# Patient Record
Sex: MALE | Race: WHITE | NOT HISPANIC OR LATINO | Employment: FULL TIME | URBAN - METROPOLITAN AREA
[De-identification: names, ages, dates, MRNs, and addresses within clinical notes are randomized per-mention and may not be internally consistent; named-entity substitution may affect disease eponyms.]

---

## 2017-08-03 ENCOUNTER — PATIENT OUTREACH (OUTPATIENT)
Dept: ADMINISTRATIVE | Facility: HOSPITAL | Age: 52
End: 2017-08-03

## 2019-07-01 ENCOUNTER — OFFICE VISIT (OUTPATIENT)
Dept: INTERNAL MEDICINE | Facility: CLINIC | Age: 54
End: 2019-07-01
Payer: COMMERCIAL

## 2019-07-01 VITALS
HEIGHT: 69 IN | SYSTOLIC BLOOD PRESSURE: 118 MMHG | OXYGEN SATURATION: 98 % | BODY MASS INDEX: 29.12 KG/M2 | HEART RATE: 83 BPM | WEIGHT: 196.63 LBS | DIASTOLIC BLOOD PRESSURE: 82 MMHG | TEMPERATURE: 99 F

## 2019-07-01 DIAGNOSIS — F32.A DEPRESSION, UNSPECIFIED DEPRESSION TYPE: ICD-10-CM

## 2019-07-01 DIAGNOSIS — K21.9 GASTROESOPHAGEAL REFLUX DISEASE, ESOPHAGITIS PRESENCE NOT SPECIFIED: ICD-10-CM

## 2019-07-01 DIAGNOSIS — Z12.11 SCREENING FOR COLON CANCER: ICD-10-CM

## 2019-07-01 DIAGNOSIS — Z00.00 ROUTINE GENERAL MEDICAL EXAMINATION AT A HEALTH CARE FACILITY: Primary | ICD-10-CM

## 2019-07-01 PROCEDURE — 99999 PR PBB SHADOW E&M-EST. PATIENT-LVL III: ICD-10-PCS | Mod: PBBFAC,,, | Performed by: INTERNAL MEDICINE

## 2019-07-01 PROCEDURE — 99999 PR PBB SHADOW E&M-EST. PATIENT-LVL III: CPT | Mod: PBBFAC,,, | Performed by: INTERNAL MEDICINE

## 2019-07-01 PROCEDURE — 99386 PREV VISIT NEW AGE 40-64: CPT | Mod: S$GLB,,, | Performed by: INTERNAL MEDICINE

## 2019-07-01 PROCEDURE — 99386 PR PREVENTIVE VISIT,NEW,40-64: ICD-10-PCS | Mod: S$GLB,,, | Performed by: INTERNAL MEDICINE

## 2019-07-01 NOTE — PROGRESS NOTES
"HPI:  Patient is a 53-year-old man who comes in today for yearly physical.  I have not seen him in about 8 years.  Patient is complaining of his left ear being full and it popping all the time.  He has no other complaints.      Current MEDS: medcard review, verified and update  Allergies: Per the electronic medical record    Past Medical History:   Diagnosis Date    Depression     GERD (gastroesophageal reflux disease)     IBS (irritable bowel syndrome)        Past Surgical History:   Procedure Laterality Date    DENTAL SURGERY      EYE SURGERY         SHx: per the electronic medical record    FHx: recorded in the electronic medical record    ROS:    denies any chest pains or shortness of breath. Denies any nausea, vomiting or diarrhea. Denies any fever, chills or sweats. Denies any change in weight, voice, stool, skin or hair. Denies any dysuria, dyspepsia or dysphagia. Denies any change in vision, hearing or headaches. Denies any swollen lymph nodes or loss of memory.    PE:  /82 (BP Location: Left arm)   Pulse 83   Temp 98.7 °F (37.1 °C) (Tympanic)   Ht 5' 9" (1.753 m)   Wt 89.2 kg (196 lb 10.4 oz)   SpO2 98%   BMI 29.04 kg/m²   Gen: Well-developed, well-nourished, male, in no acute distress, oriented x3  HEENT: neck is supple, no adenopathy, carotids 2+ equal without bruits, thyroid exam normal size without nodules.  Left external auditory canal is impacted with cerumen  CHEST: clear to auscultation and percussion  CVS: regular rate and rhythm without significant murmur, gallop, or rubs  ABD: soft, benign, no rebound no guarding, no distention.  Bowel sounds are normal.     nontender.  No palpable masses.  No organomegaly and no audible bruits.  RECTAL: no masses.  Prostate  20 Grams without nodules.  EXT: no clubbing, cyanosis, or edema  LYMPH: no cervical, inguinal, or axillary adenopathy  FEET: no loss of sensation.  No ulcers or pressure sores.  NEURO: gait normal.  Cranial nerves II- XII " intact. No nystagmus.  Speech normal.   Gross motor and sensory unremarkable.    Lab Results   Component Value Date    WBC 8.94 04/06/2009    HGB 16.4 04/06/2009    HCT 49.4 04/06/2009     04/06/2009    CHOL 183 09/08/2004    TRIG 127 09/08/2004    HDL 40.0 09/08/2004    ALT 36 04/06/2009    AST 30 04/06/2009     04/06/2009    K 4.4 04/06/2009     04/06/2009    CREATININE 1.2 04/06/2009    BUN 15 04/06/2009    CO2 26 04/06/2009    TSH 0.86 05/31/2007       Impression:  Cerumen impaction  Other medical problems below, stable  Patient Active Problem List   Diagnosis    Depression    GERD (gastroesophageal reflux disease)       Plan:   Orders Placed This Encounter    CBC auto differential    Comprehensive metabolic panel    Lipid panel    TSH    PSA, Screening    Case request GI: COLONOSCOPY    He was instructed how to clean out the ear using over-the-counter ear drops.  If he is not successful in 2 days, he should call me.  He is due to have the annual blood work above.  He is also due for his 1st colonoscopy.  He should be seen on a yearly basis or otherwise as needed    This note is generated with speech recognition software and is subject to transcription error and sound alike phrases that may be missed by proofreading.

## 2019-07-03 ENCOUNTER — OFFICE VISIT (OUTPATIENT)
Dept: OTOLARYNGOLOGY | Facility: CLINIC | Age: 54
End: 2019-07-03
Payer: COMMERCIAL

## 2019-07-03 VITALS
TEMPERATURE: 98 F | BODY MASS INDEX: 28.96 KG/M2 | HEART RATE: 84 BPM | SYSTOLIC BLOOD PRESSURE: 118 MMHG | HEIGHT: 69 IN | DIASTOLIC BLOOD PRESSURE: 84 MMHG | WEIGHT: 195.56 LBS

## 2019-07-03 DIAGNOSIS — H61.22 IMPACTED CERUMEN OF LEFT EAR: Primary | ICD-10-CM

## 2019-07-03 PROCEDURE — 99203 OFFICE O/P NEW LOW 30 MIN: CPT | Mod: 25,S$GLB,, | Performed by: PHYSICIAN ASSISTANT

## 2019-07-03 PROCEDURE — 92504 EAR MICROSCOPY EXAMINATION: CPT | Mod: S$GLB,,, | Performed by: PHYSICIAN ASSISTANT

## 2019-07-03 PROCEDURE — 99999 PR PBB SHADOW E&M-EST. PATIENT-LVL III: ICD-10-PCS | Mod: PBBFAC,,, | Performed by: PHYSICIAN ASSISTANT

## 2019-07-03 PROCEDURE — 92504 PR EAR MICROSCOPY EXAMINATION: ICD-10-PCS | Mod: S$GLB,,, | Performed by: PHYSICIAN ASSISTANT

## 2019-07-03 PROCEDURE — 3008F PR BODY MASS INDEX (BMI) DOCUMENTED: ICD-10-PCS | Mod: CPTII,S$GLB,, | Performed by: PHYSICIAN ASSISTANT

## 2019-07-03 PROCEDURE — 99999 PR PBB SHADOW E&M-EST. PATIENT-LVL III: CPT | Mod: PBBFAC,,, | Performed by: PHYSICIAN ASSISTANT

## 2019-07-03 PROCEDURE — 99203 PR OFFICE/OUTPT VISIT, NEW, LEVL III, 30-44 MIN: ICD-10-PCS | Mod: 25,S$GLB,, | Performed by: PHYSICIAN ASSISTANT

## 2019-07-03 PROCEDURE — 3008F BODY MASS INDEX DOCD: CPT | Mod: CPTII,S$GLB,, | Performed by: PHYSICIAN ASSISTANT

## 2019-07-03 NOTE — PROGRESS NOTES
REFERRING PROVIDER  Aaareferral Self  No address on file  Subjective:   Patient: Sukh Meyers [id number], :1965   Visit date:7/3/2019 9:06 AM    Chief Complaint:  No chief complaint on file.    HPI:     Sukh Meyers is a 53 y.o. male whom I am asked to see for evaluation of diminished hearing in the left ear for the past several weeks. There is not a prior history of cerumen impaction.    His meds, allergies, medical, surgical, social & family histories were reviewed & updated:  -     He has a current medication list which includes the following prescription(s): alprazolam, mirtazapine, propranolol, and ranitidine.  -     He  has a past medical history of Depression, GERD (gastroesophageal reflux disease), and IBS (irritable bowel syndrome).   -     He does not have any pertinent problems on file.   -     He  has a past surgical history that includes Eye surgery and Dental surgery.  -     He  reports that he has never smoked. He has never used smokeless tobacco. He reports that he does not drink alcohol or use drugs.  -     His family history includes COPD in his mother; Cancer in his mother; Osteoarthritis in his mother.  -     He is allergic to ear drops rx and milk containing products.      Review of Systems:  -     Allergic/Immunologic: is allergic to ear drops rx and milk containing products..  -     Constitutional: Current temp:          Objective:     Physical Exam:  Vitals:  There were no vitals taken for this visit.  Communication:  Able to communicate, no hoarseness.  Head & Face:  Normocephalic, atraumatic, no sinus tenderness.  Eyes:  Extraocular motions intact.  Ears:  Otoscopy of external auditory canals reveals impaction of left ear canals.  With the patient in the supine position, we used the operating microscope to examine both ears with the appropriate sized ear speculum.  A variety of sterile, micro-instruments were utilized to remove the cerumen atraumatically from the impacted  ear(s).   After removal, the ears were reexamined-  Right Ear:  No mass/lesion of auricle. The external auditory canals is without erythema or discharge. Pneumatic otoscopy of the tympanic membrane revealed no perforation and good mobility, with no fluid in middle ear. Clinical speech reception thresholds grossly normal.  Left Ear:  No mass/lesion of auricle. The external auditory canals is without erythema or discharge. Pneumatic otoscopy of the tympanic membrane revealed no perforation and good mobility, with no fluid in middle ear. Clinical speech reception thresholds grossly normal  Nose:  No masses/lesions of external nose, nasal mucosa, septum, and turbinates were within normal limits.  Mouth:  No mass/lesion of lips, teeth, gums, hard/soft palate, tongue, tonsils, or oropharynx.  Neck & Lymphatics:  No cervical lymphadenopathy, no neck mass/crepitus/ asymmetry, trachea is midline, no thyroid enlargement/tenderness/mass.  Neuro/Psych: Alert with normal mood and affect.   Respiration/Chest:  Symmetric expansion during respiration, normal respiratory effort.  Skin:  Warm and intact.    Assessment & Plan:       -     Cerumen Impaction - Sukh has cerumen impaction.  Impaction was remoed without difficulty and the patient tolerated this well. We discussed preventative measures and treatment options.  Q-tips must be avoided, instead the ears can be cleaned with OTC ear rinses (or a mixture of alcohol & vinegar in equal parts).   For hard wax, Sukh may place mineral oil/baby oil in the ear with a cotton ball at night and remove in the shower.  This will assist in softening the wax and allow it to drain out on its own. If the cerumen impacts the ear canal and causes hearing loss or infection he needs to follow-up in the clinic for treatment and cleaning.    Raven James PA-C

## 2019-07-08 NOTE — PROGRESS NOTES
Subjective:   Patient: Sukh Meyers [id number], :1965   Visit date:2019 3:49 PM    Chief Complaint:  No chief complaint on file.    HPI:  Sukh is a 53 y.o. male who is here for follow-up. He was last here on 19 for L cerumen impaction. He rtc on 19 and reported since his cleaning, he now feels like his hearing is worse on the right side. He feels he can hear significantly better on the left side alone. No otalgia or otorrhea. No congestion or pressure. No cold ssx. He does have a hx of allergies, is not currently taking any medications for symptoms. He does not feel his allergies are flared up right now. Allergy symptoms include nasal congestion, a post-nasal drip, and pressure. No recent audiogram.       Review of Systems:  -     Allergic/Immunologic: is allergic to ear drops rx and milk containing products..  -     Constitutional: Current temp: 97.7 °F (36.5 °C) (Tympanic)    His meds, allergies, medical, surgical, social & family histories were reviewed & updated:  -     He has a current medication list which includes the following prescription(s): alprazolam, mirtazapine, propranolol, and ranitidine.  -     He  has a past medical history of Depression, GERD (gastroesophageal reflux disease), and IBS (irritable bowel syndrome).   -     He does not have any pertinent problems on file.   -     He  has a past surgical history that includes Eye surgery and Dental surgery.  -     He  reports that he has never smoked. He has never used smokeless tobacco. He reports that he does not drink alcohol or use drugs.  -     His family history includes COPD in his mother; Cancer in his mother; Osteoarthritis in his mother.  -     He is allergic to ear drops rx and milk containing products.    Objective:     Physical Exam:  Vitals:  /82   Pulse 77   Temp 97.7 °F (36.5 °C) (Tympanic)   Wt 87.9 kg (193 lb 12.6 oz)   BMI 28.62 kg/m²   Appearance:  Well-developed,  well-nourished.  Communication:  Able to communicate, no hoarseness.  Head & Face:  Normocephalic, atraumatic, no sinus tenderness, normal facial strength.  Eyes:  Extraocular motions intact.  Ears:  Otoscopy of external auditory canals and tympanic membranes was normal, clinical speech reception thresholds grossly intact, no mass/lesion of auricle. No cerumen bilaterally.   Nose:  No masses/lesions of external nose, nasal mucosa, septum, and turbinates were within normal limits.  Mouth:  No mass/lesion of lips, teeth, gums, hard/soft palate, tongue, tonsils, or oropharynx.  Neck & Lymphatics:  No cervical lymphadenopathy, no neck mass/crepitus/ asymmetry, trachea is midline, no thyroid enlargement/tenderness/mass.  Neuro/Psych: Alert with normal mood and affect.   Abdominal: Normal appearance.   Respiration/Chest:  Symmetric expansion during respiration, normal respiratory effort.  Skin:  Warm and intact  Cardiovascular:  No peripheral vascular edema or varicosities.    Assessment & Plan:   Diagnoses and all orders for this visit:    Non-seasonal allergic rhinitis, unspecified trigger    Bilateral TM's and EAC's are WNL.   Advised trial with an anti-histamine, Xyzal, QHS and Flonase BID x 2-3 weeks. If hearing still feels worse on right side will obtain an audiogram.         Raven James PA-C  [facility]   [facility]  [address]  [place] [address]  P: [phone]  F: [phone]

## 2019-07-09 ENCOUNTER — OFFICE VISIT (OUTPATIENT)
Dept: OTOLARYNGOLOGY | Facility: CLINIC | Age: 54
End: 2019-07-09
Payer: COMMERCIAL

## 2019-07-09 VITALS
SYSTOLIC BLOOD PRESSURE: 119 MMHG | TEMPERATURE: 98 F | DIASTOLIC BLOOD PRESSURE: 82 MMHG | WEIGHT: 193.81 LBS | BODY MASS INDEX: 28.62 KG/M2 | HEART RATE: 77 BPM

## 2019-07-09 DIAGNOSIS — J30.89 NON-SEASONAL ALLERGIC RHINITIS, UNSPECIFIED TRIGGER: Primary | ICD-10-CM

## 2019-07-09 PROCEDURE — 99213 OFFICE O/P EST LOW 20 MIN: CPT | Mod: S$GLB,,, | Performed by: PHYSICIAN ASSISTANT

## 2019-07-09 PROCEDURE — 3008F BODY MASS INDEX DOCD: CPT | Mod: CPTII,S$GLB,, | Performed by: PHYSICIAN ASSISTANT

## 2019-07-09 PROCEDURE — 99999 PR PBB SHADOW E&M-EST. PATIENT-LVL III: ICD-10-PCS | Mod: PBBFAC,,, | Performed by: PHYSICIAN ASSISTANT

## 2019-07-09 PROCEDURE — 99213 PR OFFICE/OUTPT VISIT, EST, LEVL III, 20-29 MIN: ICD-10-PCS | Mod: S$GLB,,, | Performed by: PHYSICIAN ASSISTANT

## 2019-07-09 PROCEDURE — 3008F PR BODY MASS INDEX (BMI) DOCUMENTED: ICD-10-PCS | Mod: CPTII,S$GLB,, | Performed by: PHYSICIAN ASSISTANT

## 2019-07-09 PROCEDURE — 99999 PR PBB SHADOW E&M-EST. PATIENT-LVL III: CPT | Mod: PBBFAC,,, | Performed by: PHYSICIAN ASSISTANT

## 2019-11-04 ENCOUNTER — TELEPHONE (OUTPATIENT)
Dept: ENDOSCOPY | Facility: HOSPITAL | Age: 54
End: 2019-11-04

## 2020-06-17 ENCOUNTER — PATIENT OUTREACH (OUTPATIENT)
Dept: ADMINISTRATIVE | Facility: HOSPITAL | Age: 55
End: 2020-06-17

## 2020-08-21 ENCOUNTER — PATIENT OUTREACH (OUTPATIENT)
Dept: ADMINISTRATIVE | Facility: HOSPITAL | Age: 55
End: 2020-08-21

## 2020-08-21 NOTE — PROGRESS NOTES
Called Pt to scheduled Physical, No answer    Sis JUAREZ LPN Care Coordinator  Care Coordination Department  [facility]  [phone]

## 2020-10-06 ENCOUNTER — PATIENT MESSAGE (OUTPATIENT)
Dept: ADMINISTRATIVE | Facility: HOSPITAL | Age: 55
End: 2020-10-06

## 2020-11-20 ENCOUNTER — OFFICE VISIT (OUTPATIENT)
Dept: URBAN - METROPOLITAN AREA CLINIC 96 | Facility: CLINIC | Age: 55
End: 2020-11-20

## 2020-12-11 ENCOUNTER — OFFICE VISIT (OUTPATIENT)
Dept: URBAN - METROPOLITAN AREA CLINIC 96 | Facility: CLINIC | Age: 55
End: 2020-12-11

## 2021-02-12 ENCOUNTER — PATIENT OUTREACH (OUTPATIENT)
Dept: ADMINISTRATIVE | Facility: HOSPITAL | Age: 56
End: 2021-02-12

## 2021-03-25 ENCOUNTER — PATIENT MESSAGE (OUTPATIENT)
Dept: ADMINISTRATIVE | Facility: HOSPITAL | Age: 56
End: 2021-03-25

## 2021-04-28 ENCOUNTER — PATIENT MESSAGE (OUTPATIENT)
Dept: RESEARCH | Facility: HOSPITAL | Age: 56
End: 2021-04-28

## 2022-04-27 ENCOUNTER — PATIENT MESSAGE (OUTPATIENT)
Dept: ADMINISTRATIVE | Facility: HOSPITAL | Age: 57
End: 2022-04-27
Payer: COMMERCIAL

## 2023-09-13 ENCOUNTER — OFFICE VISIT (OUTPATIENT)
Dept: INTERNAL MEDICINE | Facility: CLINIC | Age: 58
End: 2023-09-13
Payer: COMMERCIAL

## 2023-09-13 VITALS
OXYGEN SATURATION: 97 % | SYSTOLIC BLOOD PRESSURE: 122 MMHG | BODY MASS INDEX: 28.67 KG/M2 | HEIGHT: 69 IN | TEMPERATURE: 98 F | DIASTOLIC BLOOD PRESSURE: 78 MMHG | HEART RATE: 97 BPM | WEIGHT: 193.56 LBS

## 2023-09-13 DIAGNOSIS — H92.09 OTALGIA, UNSPECIFIED LATERALITY: Primary | ICD-10-CM

## 2023-09-13 PROCEDURE — 1160F PR REVIEW ALL MEDS BY PRESCRIBER/CLIN PHARMACIST DOCUMENTED: ICD-10-PCS | Mod: CPTII,S$GLB,, | Performed by: INTERNAL MEDICINE

## 2023-09-13 PROCEDURE — 99999 PR PBB SHADOW E&M-EST. PATIENT-LVL IV: CPT | Mod: PBBFAC,,, | Performed by: INTERNAL MEDICINE

## 2023-09-13 PROCEDURE — 3008F BODY MASS INDEX DOCD: CPT | Mod: CPTII,S$GLB,, | Performed by: INTERNAL MEDICINE

## 2023-09-13 PROCEDURE — 3074F SYST BP LT 130 MM HG: CPT | Mod: CPTII,S$GLB,, | Performed by: INTERNAL MEDICINE

## 2023-09-13 PROCEDURE — 99213 OFFICE O/P EST LOW 20 MIN: CPT | Mod: S$GLB,,, | Performed by: INTERNAL MEDICINE

## 2023-09-13 PROCEDURE — 99213 PR OFFICE/OUTPT VISIT, EST, LEVL III, 20-29 MIN: ICD-10-PCS | Mod: S$GLB,,, | Performed by: INTERNAL MEDICINE

## 2023-09-13 PROCEDURE — 1159F PR MEDICATION LIST DOCUMENTED IN MEDICAL RECORD: ICD-10-PCS | Mod: CPTII,S$GLB,, | Performed by: INTERNAL MEDICINE

## 2023-09-13 PROCEDURE — 1160F RVW MEDS BY RX/DR IN RCRD: CPT | Mod: CPTII,S$GLB,, | Performed by: INTERNAL MEDICINE

## 2023-09-13 PROCEDURE — 3074F PR MOST RECENT SYSTOLIC BLOOD PRESSURE < 130 MM HG: ICD-10-PCS | Mod: CPTII,S$GLB,, | Performed by: INTERNAL MEDICINE

## 2023-09-13 PROCEDURE — 99999 PR PBB SHADOW E&M-EST. PATIENT-LVL IV: ICD-10-PCS | Mod: PBBFAC,,, | Performed by: INTERNAL MEDICINE

## 2023-09-13 PROCEDURE — 1159F MED LIST DOCD IN RCRD: CPT | Mod: CPTII,S$GLB,, | Performed by: INTERNAL MEDICINE

## 2023-09-13 PROCEDURE — 3078F DIAST BP <80 MM HG: CPT | Mod: CPTII,S$GLB,, | Performed by: INTERNAL MEDICINE

## 2023-09-13 PROCEDURE — 3008F PR BODY MASS INDEX (BMI) DOCUMENTED: ICD-10-PCS | Mod: CPTII,S$GLB,, | Performed by: INTERNAL MEDICINE

## 2023-09-13 PROCEDURE — 3078F PR MOST RECENT DIASTOLIC BLOOD PRESSURE < 80 MM HG: ICD-10-PCS | Mod: CPTII,S$GLB,, | Performed by: INTERNAL MEDICINE

## 2023-09-13 NOTE — PROGRESS NOTES
"HPI:  Patient is a 57-year-old man comes today with complaints of bilateral right greater than left ear discomfort.  He states he has been seeing his dentist a lot recently due to canals and extractions been done.  The dentist ask that he see somebody to make sure his ears were normal due to his complaints of ear discomfort.  He denies any frequent sinus problems.  The ear discomfort is very vague    Current meds have been verified and updated per the EMR  Exam:/78 (BP Location: Right arm, Patient Position: Sitting, BP Method: Large (Manual))   Pulse 97   Temp 97.8 °F (36.6 °C) (Tympanic)   Ht 5' 9" (1.753 m)   Wt 87.8 kg (193 lb 9 oz)   SpO2 97%   BMI 28.58 kg/m²   Both TMs are normal.  No abnormal landmarks.  No erythema.  Normal light reflex    Lab Results   Component Value Date    WBC 8.94 04/06/2009    HGB 16.4 04/06/2009    HCT 49.4 04/06/2009     04/06/2009    CHOL 183 09/08/2004    TRIG 127 09/08/2004    HDL 40.0 09/08/2004    ALT 36 04/06/2009    AST 30 04/06/2009     04/06/2009    K 4.4 04/06/2009     04/06/2009    CREATININE 1.2 04/06/2009    BUN 15 04/06/2009    CO2 26 04/06/2009    TSH 0.86 05/31/2007       Impression:  Bilateral ear discomfort, suspect due to TMJ.  Patient Active Problem List   Diagnosis    Depression    GERD (gastroesophageal reflux disease)       Plan:  Orders Placed This Encounter    Ambulatory referral/consult to ENT     Patient would like to see ENT doctor just to make sure there is nothing wrong with his ears before proceeding seen a TMJ Dr.    This note is generated with speech recognition software and is subject to transcription error and sound alike phrases that may be missed by proofreading.      "

## 2023-09-20 ENCOUNTER — TELEPHONE (OUTPATIENT)
Dept: OTOLARYNGOLOGY | Facility: CLINIC | Age: 58
End: 2023-09-20
Payer: COMMERCIAL

## 2023-09-21 ENCOUNTER — CLINICAL SUPPORT (OUTPATIENT)
Dept: AUDIOLOGY | Facility: CLINIC | Age: 58
End: 2023-09-21
Payer: COMMERCIAL

## 2023-09-21 ENCOUNTER — PATIENT MESSAGE (OUTPATIENT)
Dept: ADMINISTRATIVE | Facility: HOSPITAL | Age: 58
End: 2023-09-21
Payer: COMMERCIAL

## 2023-09-21 ENCOUNTER — OFFICE VISIT (OUTPATIENT)
Dept: OTOLARYNGOLOGY | Facility: CLINIC | Age: 58
End: 2023-09-21
Payer: COMMERCIAL

## 2023-09-21 VITALS — HEIGHT: 69 IN | WEIGHT: 194 LBS | BODY MASS INDEX: 28.73 KG/M2 | TEMPERATURE: 99 F

## 2023-09-21 DIAGNOSIS — H92.09 OTALGIA, UNSPECIFIED LATERALITY: ICD-10-CM

## 2023-09-21 DIAGNOSIS — J32.4 CHRONIC PANSINUSITIS: ICD-10-CM

## 2023-09-21 DIAGNOSIS — H93.13 TINNITUS, BILATERAL: Primary | ICD-10-CM

## 2023-09-21 DIAGNOSIS — H93.13 TINNITUS AURIUM, BILATERAL: Primary | ICD-10-CM

## 2023-09-21 PROCEDURE — 99203 OFFICE O/P NEW LOW 30 MIN: CPT | Mod: S$GLB,,, | Performed by: OTOLARYNGOLOGY

## 2023-09-21 PROCEDURE — 92567 TYMPANOMETRY: CPT | Mod: S$GLB,,, | Performed by: AUDIOLOGIST-HEARING AID FITTER

## 2023-09-21 PROCEDURE — 99999 PR PBB SHADOW E&M-EST. PATIENT-LVL III: CPT | Mod: PBBFAC,,, | Performed by: OTOLARYNGOLOGY

## 2023-09-21 PROCEDURE — 3008F PR BODY MASS INDEX (BMI) DOCUMENTED: ICD-10-PCS | Mod: CPTII,S$GLB,, | Performed by: OTOLARYNGOLOGY

## 2023-09-21 PROCEDURE — 92557 COMPREHENSIVE HEARING TEST: CPT | Mod: S$GLB,,, | Performed by: AUDIOLOGIST-HEARING AID FITTER

## 2023-09-21 PROCEDURE — 1159F MED LIST DOCD IN RCRD: CPT | Mod: CPTII,S$GLB,, | Performed by: OTOLARYNGOLOGY

## 2023-09-21 PROCEDURE — 92557 PR COMPREHENSIVE HEARING TEST: ICD-10-PCS | Mod: S$GLB,,, | Performed by: AUDIOLOGIST-HEARING AID FITTER

## 2023-09-21 PROCEDURE — 1160F RVW MEDS BY RX/DR IN RCRD: CPT | Mod: CPTII,S$GLB,, | Performed by: OTOLARYNGOLOGY

## 2023-09-21 PROCEDURE — 92567 PR TYMPA2METRY: ICD-10-PCS | Mod: S$GLB,,, | Performed by: AUDIOLOGIST-HEARING AID FITTER

## 2023-09-21 PROCEDURE — 1160F PR REVIEW ALL MEDS BY PRESCRIBER/CLIN PHARMACIST DOCUMENTED: ICD-10-PCS | Mod: CPTII,S$GLB,, | Performed by: OTOLARYNGOLOGY

## 2023-09-21 PROCEDURE — 99999 PR PBB SHADOW E&M-EST. PATIENT-LVL III: ICD-10-PCS | Mod: PBBFAC,,, | Performed by: OTOLARYNGOLOGY

## 2023-09-21 PROCEDURE — 3008F BODY MASS INDEX DOCD: CPT | Mod: CPTII,S$GLB,, | Performed by: OTOLARYNGOLOGY

## 2023-09-21 PROCEDURE — 99999 PR PBB SHADOW E&M-EST. PATIENT-LVL I: CPT | Mod: PBBFAC,,, | Performed by: AUDIOLOGIST-HEARING AID FITTER

## 2023-09-21 PROCEDURE — 1159F PR MEDICATION LIST DOCUMENTED IN MEDICAL RECORD: ICD-10-PCS | Mod: CPTII,S$GLB,, | Performed by: OTOLARYNGOLOGY

## 2023-09-21 PROCEDURE — 99203 PR OFFICE/OUTPT VISIT, NEW, LEVL III, 30-44 MIN: ICD-10-PCS | Mod: S$GLB,,, | Performed by: OTOLARYNGOLOGY

## 2023-09-21 PROCEDURE — 99999 PR PBB SHADOW E&M-EST. PATIENT-LVL I: ICD-10-PCS | Mod: PBBFAC,,, | Performed by: AUDIOLOGIST-HEARING AID FITTER

## 2023-09-21 NOTE — PROGRESS NOTES
Referring provider: Dr. Sona Luz Gagan Meyers was seen 09/21/2023 for an audiological evaluation.  Patient complains of right sided dental pain, ear fullness, right sided otalgia.  He has had extensive dental work recently including a root canal on one of his right posterior maxillary molars and several extractions.  He has had extensive dental imaging including x rays which have failed to demonstrate an obvious cause for his pain.  He has also had extensive antibiotic treatment and NSAIDS.  He also reports bilateral nonpulsatile tinnitus which seems to be getting worse over the past few years. No hearing loss. No significant noise history. No previous otologic surgery.     Results reveal normal hearing with a mild sensorineural hearing notch at 750 Hz and 8000 Hz for the right ear, and normal hearing with a mild sensorineural hearing loss notch at 500 Hz and 8000 Hz for the left ear.   Speech Reception Thresholds were 15 dBHL for the right ear and 15 dBHL for the left ear.   Word recognition scores were excellent for the right ear and excellent for the left ear.   Tympanograms were Type A for the right ear and Type A for the left ear.    Patient was counseled on the above findings.    Recommendations:  ENT at completion of this visit. Patient noticed the tinnitus worsening mostly when he was taking large doses of Advil per his dental professional.  Since stopping the Advil, the tinnitus has improved significantly.  Tinnitus masking and management strategies were discussed, including sound machines as needed.

## 2023-09-21 NOTE — PROGRESS NOTES
"Referring Provider:    Amor Thompson Md  [address]  [address]  [place] [address]  Subjective:   Patient: Sukh Meyers [id number], :1965   Visit date:2023 9:04 AM    Chief Complaint:  Otalgia (Pt states has had a lot of dental work done recently. C/o pain and tenderness on right side mouth, bilateral ear fullness)    HPI:    Prior notes reviewed by myself.  Clinical documentation obtained by nursing staff reviewed.     57 y/o gentleman here for evaluation of right sided dental pain, ear fullness, right sided otalgia.  He has had extensive dental work recently including a root canal on one of his right posterior maxillary molars and several extractions.  He has had extensive dental imaging including x rays which have failed to demonstrate an obvious cause for his pain.  He has also had extensive antibiotic treatment and NSAIDS.  He also reports bilateral nonpulsatile tinnitus which seems to be getting worse over the past few years.        Objective:     Physical Exam:  Vitals:  Temp 99 °F (37.2 °C) (Temporal)   Ht 5' 9" (1.753 m)   Wt 88 kg (194 lb 0.1 oz)   BMI 28.65 kg/m²   General appearance:  Well developed, well nourished    Ears:  Otoscopy of external auditory canals and tympanic membranes was normal, clinical speech reception thresholds grossly intact, no mass/lesion of auricle.    Nose:  No masses/lesions of external nose, nasal mucosa, septum, and turbinates were within normal limits.    Mouth:  No mass/lesion of lips, teeth, gums, hard/soft palate, tongue, tonsils, or oropharynx.    Neck & Lymphatics:  No cervical lymphadenopathy, no neck mass/crepitus/ asymmetry, trachea is midline, no thyroid enlargement/tenderness/mass.        [x]  Data Reviewed:    Lab Results   Component Value Date    WBC 8.94 2009    HGB 16.4 2009    HCT 49.4 2009    MCV 89.7 2009    EOSINOPHIL 2.2 2009         [x]  Independent interpretation of test: very slight SNHL in " low and high frequencies     Media Information    File Link    Annotation on 9/21/2023  8:43 AM by Abhinav Cisse Au.D, DAR-JOSSUE: AUDIOGRAM        Key Information    Document ID File Type Document Type Description   [id number] Annotation Annotation AUDIOGRAM     Import Information    Attached At Date Time User Dept   Encounter Level 9/21/2023  8:43 AM Abhinav Cisse Au.D, CCC-A On Audiology     Encounter    Clinical Support on 9/21/23 with Abhinav Cisse Au.D, JUSTIN             Assessment & Plan:   Tinnitus aurium, bilateral    Otalgia, unspecified laterality  -     Ambulatory referral/consult to ENT    Chronic pansinusitis  -     CT [employer] Sinuses without; Future; Expected date: 09/21/2023        We reviewed his audiogram in detail which shows a borderline to very mild hearing loss only in 2 frequencies.  He noticed the tinnitus worsening mostly when he was taking large doses of Advil per his dental professional.  Since stopping the Advil, the tinnitus has improved significantly.  He continues to have chronic mid facial and upper tooth pain.  This has been going on for well over 12 weeks and he has had an extensive workup as well as medical treatment for his teeth/sinusitis.  We agreed that a CT sinus was the only definitive way to rule out an occult chronic sinus infection.  We will follow-up with him for results.

## 2023-10-04 ENCOUNTER — HOSPITAL ENCOUNTER (OUTPATIENT)
Dept: RADIOLOGY | Facility: HOSPITAL | Age: 58
Discharge: HOME | End: 2023-10-04
Attending: OTOLARYNGOLOGY
Payer: COMMERCIAL

## 2023-10-04 DIAGNOSIS — J32.4 CHRONIC PANSINUSITIS: ICD-10-CM

## 2023-10-04 PROCEDURE — 70486 CT MAXILLOFACIAL W/O DYE: CPT | Mod: TC

## 2023-10-04 PROCEDURE — 70486 CT MAXILLOFACIAL W/O DYE: CPT | Mod: 26,,, | Performed by: RADIOLOGY

## 2023-10-04 PROCEDURE — 70486 CT MEDTRONIC SINUSES WITHOUT: ICD-10-PCS | Mod: 26,,, | Performed by: RADIOLOGY

## 2023-10-05 NOTE — PROGRESS NOTES
He could cancel the appointment with me.  If he is still having pain in the area of that tooth, I would recommend that he obtain a disc with the images to bring to his dentist to discuss.  You can see the tooth and tooth roots etc.on the imaging.  Thanks.

## 2024-02-06 DIAGNOSIS — Z12.11 COLON CANCER SCREENING: ICD-10-CM

## 2024-05-09 ENCOUNTER — LAB VISIT (OUTPATIENT)
Dept: LAB | Facility: HOSPITAL | Age: 59
End: 2024-05-09
Attending: INTERNAL MEDICINE
Payer: COMMERCIAL

## 2024-05-09 ENCOUNTER — PATIENT MESSAGE (OUTPATIENT)
Dept: INTERNAL MEDICINE | Facility: CLINIC | Age: 59
End: 2024-05-09
Payer: COMMERCIAL

## 2024-05-09 DIAGNOSIS — Z12.11 COLON CANCER SCREENING: ICD-10-CM

## 2024-05-09 LAB — HEMOCCULT STL QL IA: NEGATIVE

## 2024-05-09 PROCEDURE — 82274 ASSAY TEST FOR BLOOD FECAL: CPT | Performed by: INTERNAL MEDICINE

## 2024-11-30 ENCOUNTER — HOSPITAL ENCOUNTER (EMERGENCY)
Facility: HOSPITAL | Age: 59
Discharge: HOME | End: 2024-11-30
Attending: EMERGENCY MEDICINE
Payer: COMMERCIAL

## 2024-11-30 ENCOUNTER — OFFICE VISIT (OUTPATIENT)
Dept: URGENT CARE | Facility: CLINIC | Age: 59
End: 2024-11-30
Payer: COMMERCIAL

## 2024-11-30 VITALS
BODY MASS INDEX: 25.27 KG/M2 | WEIGHT: 170.63 LBS | TEMPERATURE: 98 F | HEIGHT: 69 IN | SYSTOLIC BLOOD PRESSURE: 143 MMHG | RESPIRATION RATE: 16 BRPM | DIASTOLIC BLOOD PRESSURE: 79 MMHG | HEART RATE: 84 BPM | OXYGEN SATURATION: 100 %

## 2024-11-30 VITALS
TEMPERATURE: 98 F | RESPIRATION RATE: 18 BRPM | HEIGHT: 69 IN | WEIGHT: 170 LBS | DIASTOLIC BLOOD PRESSURE: 63 MMHG | HEART RATE: 75 BPM | BODY MASS INDEX: 25.18 KG/M2 | SYSTOLIC BLOOD PRESSURE: 134 MMHG | OXYGEN SATURATION: 99 %

## 2024-11-30 DIAGNOSIS — R13.10 ODYNOPHAGIA: Primary | ICD-10-CM

## 2024-11-30 DIAGNOSIS — T18.128A FOOD IMPACTION OF ESOPHAGUS, INITIAL ENCOUNTER: ICD-10-CM

## 2024-11-30 DIAGNOSIS — T18.108A FOREIGN BODY IN ESOPHAGUS, INITIAL ENCOUNTER: Primary | ICD-10-CM
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
## 2024-11-30 PROCEDURE — 99215 OFFICE O/P EST HI 40 MIN: CPT | Mod: S$GLB,,, | Performed by: PHYSICIAN ASSISTANT

## 2024-11-30 PROCEDURE — 99281 EMR DPT VST MAYX REQ PHY/QHP: CPT

## 2024-11-30 RX ORDER — PANTOPRAZOLE SODIUM 40 MG/1
40 TABLET, DELAYED RELEASE ORAL DAILY
Qty: 30 TABLET | Refills: 0 | Status: SHIPPED | OUTPATIENT
Start: 2024-11-30 | End: 2024-12-30

## 2024-11-30 RX ORDER — LIDOCAINE HYDROCHLORIDE 20 MG/ML
15 SOLUTION OROPHARYNGEAL
Status: COMPLETED | OUTPATIENT
Start: 2024-11-30 | End: 2024-11-30

## 2024-11-30 RX ORDER — ALUMINUM HYDROXIDE, MAGNESIUM HYDROXIDE, AND SIMETHICONE 1200; 120; 1200 MG/30ML; MG/30ML; MG/30ML
30 SUSPENSION ORAL
Status: COMPLETED | OUTPATIENT
Start: 2024-11-30 | End: 2024-11-30

## 2024-11-30 RX ADMIN — LIDOCAINE HYDROCHLORIDE 15 ML: 20 SOLUTION OROPHARYNGEAL at 05:11

## 2024-11-30 RX ADMIN — ALUMINUM HYDROXIDE, MAGNESIUM HYDROXIDE, AND SIMETHICONE 30 ML: 1200; 120; 1200 SUSPENSION ORAL at 05:11

## 2024-11-30 NOTE — PATIENT INSTRUCTIONS
Start PRILOSEC daily. Carafate 30 minutes prior to meals. You need to follow up with gastroenterologist if food sensation does not start to improve over the weekend. Call Monday  to schedule. You should be on a liquid diet until pain improves then a soft diet.     You need to go immediately to the ER if you cannot keep down liquids or your saliva, or if you get severe pain or shortness of breath.    Avoid fatty, greasy, fried, and spicy foods. Eat smaller, more frequent meals. Avoid alcohol and smoking. Avoid NSAIDS (ibuprofen, naproxen) and aspirin. Avoid laying down for 2 hours after eating.

## 2024-11-30 NOTE — PROGRESS NOTES
"Subjective:      Patient ID: Sukh Meyers is a 59 y.o. male.    Vitals:  height is 5' 9" (1.753 m) and weight is 77.4 kg (170 lb 10.2 oz). His tympanic temperature is 97.8 °F (36.6 °C). His blood pressure is 143/79 (abnormal) and his pulse is 84. His respiration is 16 and oxygen saturation is 100%.     Chief Complaint: Choking    Pt states roughly an hour ago while eating dinner a piece of turkey felt lodged in his throat. Pt states he has difficulty swallowing any substance. Pt is alert and oriented with normal breathing. Pt states the feeling of the lodged object is causing throat pain. Pt attempted to wash the food down with water. The water comes right back up.    Other  This is a new problem. The current episode started today. The problem occurs constantly. The problem has been unchanged. Associated symptoms include coughing. The symptoms are aggravated by swallowing. He has tried nothing for the symptoms. The treatment provided no relief.     Respiratory:  Positive for cough.     Objective:     Physical Exam    Assessment:     No diagnosis found.    Plan:       There are no diagnoses linked to this encounter.                  "

## 2024-11-30 NOTE — PROGRESS NOTES
"Subjective:      Patient ID: Sukh Meyers is a 59 y.o. male.    Vitals:  height is 5' 9" (1.753 m) and weight is 77.4 kg (170 lb 10.2 oz). His tympanic temperature is 97.8 °F (36.6 °C). His blood pressure is 143/79 (abnormal) and his pulse is 84. His respiration is 16 and oxygen saturation is 100%.     Chief Complaint: Choking    Ricky Meyers is a 59 year old male with a PMHx of GERD presenting with foreign body sensation in distal esophagus. This started about a hour ago when he was eating a piece of turkey. He states the turkey had no bones or sharp materials. He is unable to tolerate food or water since. When he drinks water it feels like it gets stuck and he coughs it back up. He has tried no other treatment. He has associated cough,vomiting, and heartburn. He denies dyspnea, trouble swallowing, swelling, and voice change. He has not been taking his prilosec for many months. He has had worsening painful swallowing and previous food bolus sensations recently "but they are usually able to eventually pass down."    Other  This is a new problem. Associated symptoms include coughing, nausea and vomiting. Pertinent negatives include no abdominal pain or sore throat.       HENT:  Negative for sore throat, trouble swallowing and voice change.    Respiratory:  Positive for cough. Negative for shortness of breath and wheezing.    Gastrointestinal:  Positive for nausea, vomiting and heartburn. Negative for abdominal trauma, abdominal pain, abdominal bloating, history of abdominal surgery, constipation, diarrhea, bright red blood in stool and dark colored stools.   Skin:  Negative for color change, pale and hives.   Allergic/Immunologic: Negative for hives.   Neurological:  Negative for dizziness, light-headedness and passing out.      Objective:     Physical Exam   Constitutional: He is oriented to person, place, and time.  Non-toxic appearance. He does not appear ill. No distress.      Comments:Not tolerating " secretions   normal  HENT:   Head: Normocephalic and atraumatic.   Nose: Nose normal.   Mouth/Throat: Mucous membranes are moist. Oropharynx is clear.      Comments: No perforation, erythema, swelling , or foreign body.   Eyes: Conjunctivae are normal.   Neck: Neck supple.   Cardiovascular: Normal rate, regular rhythm, normal heart sounds and normal pulses.   Pulmonary/Chest: Effort normal and breath sounds normal. No stridor. No tachypnea. No respiratory distress. He has no wheezes. He has no rhonchi. He has no rales.   No crepitus          Comments: No crepitus     Abdominal: Normal appearance and bowel sounds are normal. He exhibits no distension and no mass. Soft. There is no abdominal tenderness. There is no rebound and no guarding. No hernia.   Neurological: He is alert and oriented to person, place, and time.   Skin: Skin is warm, not diaphoretic and not pale. Capillary refill takes less than 2 seconds. jaundice  Vitals reviewed.      Assessment:     1. Odynophagia    2. Food impaction of esophagus, initial encounter        Plan:       Odynophagia  -     aluminum-magnesium hydroxide-simethicone 200-200-20 mg/5 mL suspension 30 mL  -     LIDOcaine viscous HCl 2% oral solution 15 mL  -     Ambulatory referral/consult to Gastroenterology  -     pantoprazole (PROTONIX) 40 MG tablet; Take 1 tablet (40 mg total) by mouth once daily.  Dispense: 30 tablet; Refill: 0  -     Refer to Emergency Dept.    Food impaction of esophagus, initial encounter  -     Refer to Emergency Dept.    Sissy Dinh PA-C  [facility]             Medical Decision Making:   Urgent Care Management:  Ricky Meyers is a 59 year old male presenting with foreign body sensation in esophagus. He was eating turkey when this sensation started. No bones or sharp material were in the food. He is unable to tolerate water and food since, feels like water gets stuck and has to cough it up. His vital are wnl . On exam, no foreign body,  perforation, swelling , or erythema seen in throat. He is alert , able to speak in full sentence. He is not in respiratory distress but is unable to tolerate secretions or liquids. Given GI cocktail in clinic for symptoms. Unable to tolerate cocktail. Case discussed with Dr. Keyanna FELICIANO on call - recommends ER evaluation for labwork, possible CT and reconsult her if needed. Patient will go straight to [facility] room, they were notified.

## 2024-12-01 ENCOUNTER — TELEPHONE (OUTPATIENT)
Dept: URGENT CARE | Facility: CLINIC | Age: 59
End: 2024-12-01
Payer: COMMERCIAL

## 2024-12-01 NOTE — ED PROVIDER NOTES
"SCRIBE #1 NOTE: I, Cassy Vaz, am scribing for, and in the presence of, Victor M Ayala DO. I have scribed the entire note.       History     Chief Complaint   Patient presents with    food bolus     Pt sent from [facility] for possible food bolus, pt reports he was eating a turkey dinner, felt like something got stuck, drank water but it came back up even after several attempts.      Review of patient's allergies indicates:   Allergen Reactions    Ear drops rx Swelling     Not sure of name of medication took as a teen caused facial swelling    Milk containing products (dairy) Itching     Itching eyes         History of Present Illness     HPI    11/30/2024, 7:05 PM  History obtained from the patient      History of Present Illness: Sukh Meyers is a 59 y.o. male patient with a PMHx of GERD, IBS, and depression who presents to the Emergency Department for evaluation of possible food bolus which onset PTA. Pt was eating turkey and it felt like something got stuck. Pt tried to drink water, and he felt the water stuck FCI down. Pt went to  and sipped on "white liquid" that he coughed back up, unable to digest. At , pt for sure felt something stuck.  recommended pt get evaluated at ED for possible food bolus. Symptoms are constant and moderate in severity. In ED, pt reports "it might've passed." No further complaints or concerns at this time.       Arrival mode: Personal vehicle    PCP: Amor Thompson MD        Past Medical History:  Past Medical History:   Diagnosis Date    Depression     GERD (gastroesophageal reflux disease)     IBS (irritable bowel syndrome)        Past Surgical History:  Past Surgical History:   Procedure Laterality Date    DENTAL SURGERY      EYE SURGERY           Family History:  Family History   Problem Relation Name Age of Onset    Cancer Mother          breast ca, throat, liver    COPD Mother      Osteoarthritis Mother         Social History:  Social " "History     Tobacco Use    Smoking status: Never    Smokeless tobacco: Never   Substance and Sexual Activity    Alcohol use: No    Drug use: No    Sexual activity: Not on file        Review of Systems     Review of Systems   HENT:  Positive for trouble swallowing.       Physical Exam     Initial Vitals [11/30/24 1840]   BP Pulse Resp Temp SpO2   (!) 141/67 81 20 98 °F (36.7 °C) 99 %      MAP       --          Physical Exam  Vitals reviewed.   Constitutional:       General: He is not in acute distress.     Appearance: Normal appearance.   HENT:      Mouth/Throat:      Comments: No oropharyngeal edema, no foreign body noted  Abdominal:      General: There is no distension.      Palpations: Abdomen is soft.      Tenderness: There is no abdominal tenderness.   Musculoskeletal:         General: Normal range of motion.   Skin:     General: Skin is warm and dry.      Findings: No rash.   Neurological:      General: No focal deficit present.      Mental Status: He is alert and oriented to person, place, and time.            ED Course   Procedures  ED Vital Signs:  Vitals:    11/30/24 1840 11/30/24 2029   BP: (!) 141/67 134/63   Pulse: 81 75   Resp: 20 18   Temp: 98 °F (36.7 °C) 98.2 °F (36.8 °C)   TempSrc: Oral Oral   SpO2: 99% 99%   Weight: 77.1 kg (169 lb 15.6 oz)    Height: 5' 9" (1.753 m)        Abnormal Lab Results:  Labs Reviewed - No data to display       All Lab Results:  None    Imaging Results:  Imaging Results    None               The Emergency Provider reviewed the vital signs and test results, which are outlined above.     ED Discussion     8:15 PM: Reassessed pt at this time. Discussed with pt all pertinent ED information and results. Discussed pt dx and plan of tx. Gave pt all f/u and return to the ED instructions. All questions and concerns were addressed at this time. Pt expresses understanding of information and instructions, and is comfortable with plan to discharge. Pt is stable for discharge.    I " discussed with patient and/or family/caretaker that evaluation in the ED does not suggest any emergent or life threatening medical conditions requiring immediate intervention beyond what was provided in the ED, and I believe patient is safe for discharge.  Regardless, an unremarkable evaluation in the ED does not preclude the development or presence of a serious of life threatening condition. As such, patient was instructed to return immediately for any worsening or change in current symptoms.    ED Course as of 12/03/24 0402   Sat Nov 30, 2024 2012 Upon arrival to the emergency department the patient states that he feels like he passed his food bolus.  He was able to tolerate p.o. [employer] with no issues. [CD]      ED Course User Index  [CD] Victor M Ayala, DO     Medical Decision Making  A referral to Gastroenterology has already been placed for this patient.  I instructed him to return to the emergency department immediately for any new or worsening symptoms and he verbalized understanding.    Amount and/or Complexity of Data Reviewed  Labs: ordered.    Risk  Risk Details: Differential diagnosis includes but is not limited to:  Esophageal food bolus, GERD, gastritis, peptic ulcer disease                ED Medication(s):  Medications - No data to display    Discharge Medication List as of 11/30/2024  8:14 PM           Follow-up Information       Schedule an appointment as soon as possible for a visit  with Amor Thompson MD.    Specialty: Internal Medicine  Contact information:  [address]   [address]  [facility] [address]  [phone]               Go to  [facility] - Emergency Dept..    Specialty: Emergency Medicine  Why: As needed, If symptoms worsen  Contact information:  [address]  [facility] [address] [phone]                               Scribe Attestation:   Scribe #1: I performed the above scribed service and the documentation accurately describes the  services I performed. I attest to the accuracy of the note.     Attending:   Physician Attestation Statement for Scribe #1: I, Victor M Ayala DO, personally performed the services described in this documentation, as scribed by Cassy Vaz, in my presence, and it is both accurate and complete.           Clinical Impression       ICD-10-CM ICD-9-CM   1. Foreign body in esophagus, initial encounter  T18.108A 935.1     E915       Disposition:   Disposition: Discharged  Condition: Stable       Victor M Ayala DO  12/03/24 0404

## 2024-12-01 NOTE — TELEPHONE ENCOUNTER
Attempt to reach pt regarding Dr. Briceño (GI) recommendation to take protonix BID until able to have scope this week. [facility] to return call to clinic.

## 2024-12-01 NOTE — TELEPHONE ENCOUNTER
Pt  returned call. Went over plan with pt to start PROTONIX BID and soft diet until endoscopy done. Referral was placed for EGD. Dr. Luna aware of plan.

## 2024-12-04 ENCOUNTER — TELEPHONE (OUTPATIENT)
Dept: PREADMISSION TESTING | Facility: HOSPITAL | Age: 59
End: 2024-12-04
Payer: COMMERCIAL

## 2024-12-04 ENCOUNTER — OFFICE VISIT (OUTPATIENT)
Dept: GASTROENTEROLOGY | Facility: CLINIC | Age: 59
End: 2024-12-04
Payer: COMMERCIAL

## 2024-12-04 DIAGNOSIS — T18.128A FOOD IMPACTION OF ESOPHAGUS, INITIAL ENCOUNTER: ICD-10-CM

## 2024-12-04 DIAGNOSIS — R13.10 ODYNOPHAGIA: Primary | ICD-10-CM
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
## 2024-12-04 DIAGNOSIS — R13.19 ESOPHAGEAL DYSPHAGIA: ICD-10-CM

## 2024-12-04 DIAGNOSIS — K21.9 GASTROESOPHAGEAL REFLUX DISEASE, UNSPECIFIED WHETHER ESOPHAGITIS PRESENT: Primary | ICD-10-CM

## 2024-12-04 DIAGNOSIS — T18.128S FOOD IMPACTION OF ESOPHAGUS, SEQUELA: ICD-10-CM

## 2024-12-04 PROCEDURE — 99204 OFFICE O/P NEW MOD 45 MIN: CPT | Mod: 95,,, | Performed by: INTERNAL MEDICINE

## 2024-12-04 NOTE — PROGRESS NOTES
[facility]  Gastroenterology    Patient evaluated at the request of Sissy Dinh PA-C  [address]  [address]  [place] [address]    PCP: Amor Thompson MD    12/4/24    The patient location is: Home  The chief complaint leading to consultation is: Dysphagia, Hx of food impaction    Visit type: audiovisual    Face to Face time with patient: 15 minutes of total time spent on the encounter, which includes face to face time and non-face to face time preparing to see the patient (eg, review of tests), Obtaining and/or reviewing separately obtained history, Documenting clinical information in the electronic or other health record, Independently interpreting results (not separately reported) and communicating results to the patient/family/caregiver, or Care coordination (not separately reported).         Each patient to whom he or she provides medical services by telemedicine is:  (1) informed of the relationship between the physician and patient and the respective role of any other health care provider with respect to management of the patient; and (2) notified that he or she may decline to receive medical services by telemedicine and may withdraw from such care at any time.    Notes:       Subjective:   Sukh Meyers is a 59 y.o. male here for evaluation of GERD and dysphagia. Dysphagia is to meats, started 2 years ago and now worsening. Had recent ED visit last month for food impaction which passed on its own. Recently started onto Protonix. Used to take Prilosec many years ago but it stopped working so he had to add Zantac. Eventually he was concerned about rebound acid production so only took Pepcid as needed. Last EGD was approx 10 years ago. Recently worked on lifestyle modifications such as weight loss and weaning off of caffeine. He is scheduled for an EGD this week.       Past Medical History:   Diagnosis Date    Depression     GERD (gastroesophageal reflux disease)      IBS (irritable bowel syndrome)        Past Surgical History:   Procedure Laterality Date    DENTAL SURGERY      EYE SURGERY         Current Outpatient Medications on File Prior to Visit   Medication Sig Dispense Refill    alprazolam (XANAX) 0.25 MG tablet   0    mirtazapine (REMERON) 30 MG tablet   0    pantoprazole (PROTONIX) 40 MG tablet Take 1 tablet (40 mg total) by mouth once daily. 30 tablet 0    propranolol (INDERAL) 10 MG tablet Take 10 mg by mouth.      ranitidine (ZANTAC) 150 MG tablet Take 150 mg by mouth daily as needed.       No current facility-administered medications on file prior to visit.       Review of patient's allergies indicates:   Allergen Reactions    Ear drops rx Swelling     Not sure of name of medication took as a teen caused facial swelling    Milk containing products (dairy) Itching     Itching eyes       Social History     Socioeconomic History    Marital status: Single   Occupational History     Employer: [employer]   Tobacco Use    Smoking status: Never    Smokeless tobacco: Never   Substance and Sexual Activity    Alcohol use: No    Drug use: No     Social Drivers of Health     Financial Resource Strain: Low Risk  (9/11/2023)    Overall Financial Resource Strain (CARDIA)     Difficulty of Paying Living Expenses: Not hard at all   Food Insecurity: No Food Insecurity (9/11/2023)    Hunger Vital Sign     Worried About Running Out of Food in the Last Year: Never true     Ran Out of Food in the Last Year: Never true   Transportation Needs: No Transportation Needs (9/11/2023)    PRAPARE - Transportation     Lack of Transportation (Medical): No     Lack of Transportation (Non-Medical): No   Physical Activity: Sufficiently Active (9/11/2023)    Exercise Vital Sign     Days of Exercise per Week: 6 days     Minutes of Exercise per Session: 60 min   Stress: Stress Concern Present (9/11/2023)    [employer] - Occupational Stress Questionnaire      Feeling of Stress : To some extent   Housing Stability: Low Risk  (9/11/2023)    Housing Stability Vital Sign     Unable to Pay for Housing in the Last Year: No     Number of Places Lived in the Last Year: 1     Unstable Housing in the Last Year: No       Family History   Problem Relation Name Age of Onset    Cancer Mother          breast ca, throat, liver    COPD Mother      Osteoarthritis Mother         Review of Systems   Constitutional:  Negative for appetite change, fever and unexpected weight change.   HENT:  Positive for trouble swallowing. Negative for sore throat.    Eyes:  Negative for visual disturbance.   Respiratory:  Negative for cough, shortness of breath and wheezing.    Cardiovascular:  Negative for chest pain, palpitations and leg swelling.   Gastrointestinal:  Negative for abdominal pain, blood in stool, constipation, diarrhea, nausea and vomiting.   Genitourinary:  Negative for dysuria.   Musculoskeletal:  Negative for arthralgias and myalgias.   Skin:  Negative for color change, pallor and rash.   Neurological:  Negative for dizziness and weakness.   Hematological:  Negative for adenopathy.   Psychiatric/Behavioral:  Negative for agitation.            Objective:   Vitals: There were no vitals filed for this visit.    Physical Exam Unable to perform due to video visit    IMPRESSION     Problem List Items Addressed This Visit          GI    GERD (gastroesophageal reflux disease) - Primary     Other Visit Diagnoses       Esophageal dysphagia        Food impaction of esophagus, sequela                PLANS:    - Continue with Protonix x 8 weeks. If doing well after this, can consider tapering off  - Agree with proceeding with EGD, already ordered and scheduled for 12/06  - Strict reflux precautions discussed- avoidance of caffeine/chocolate/mints, HOB elevation at night and avoidance of eating at least 3 hours before bedtime  - Dysphagia precautions  - Further recommendations pending the  above    Gastroesophageal reflux disease, unspecified whether esophagitis present    Esophageal dysphagia    Food impaction of esophagus, sequela      Krystal Camilo MD  Gastroenterology

## 2024-12-06 ENCOUNTER — ANESTHESIA EVENT (OUTPATIENT)
Dept: ENDOSCOPY | Facility: HOSPITAL | Age: 59
End: 2024-12-06
Payer: COMMERCIAL

## 2024-12-06 ENCOUNTER — ANESTHESIA (OUTPATIENT)
Dept: ENDOSCOPY | Facility: HOSPITAL | Age: 59
End: 2024-12-06
Payer: COMMERCIAL

## 2024-12-06 ENCOUNTER — HOSPITAL ENCOUNTER (OUTPATIENT)
Facility: HOSPITAL | Age: 59
Discharge: HOME | End: 2024-12-06
Attending: INTERNAL MEDICINE | Admitting: INTERNAL MEDICINE
Payer: COMMERCIAL

## 2024-12-06 DIAGNOSIS — R13.10 ODYNOPHAGIA: ICD-10-CM

## 2024-12-06 DIAGNOSIS — K21.9 GERD (GASTROESOPHAGEAL REFLUX DISEASE): ICD-10-CM

## 2024-12-06 DIAGNOSIS — T18.128D FOOD IMPACTION OF ESOPHAGUS, SUBSEQUENT ENCOUNTER: ICD-10-CM
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
## 2024-12-06 DIAGNOSIS — R13.19 ESOPHAGEAL DYSPHAGIA: ICD-10-CM

## 2024-12-06 DIAGNOSIS — K21.9 GASTROESOPHAGEAL REFLUX DISEASE, UNSPECIFIED WHETHER ESOPHAGITIS PRESENT: Primary | ICD-10-CM

## 2024-12-06 PROBLEM — T18.128A FOOD IMPACTION OF ESOPHAGUS: Status: ACTIVE | Noted: 2024-12-06
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
## 2024-12-06 PROCEDURE — 43249 ESOPH EGD DILATION <30 MM: CPT | Mod: ,,, | Performed by: INTERNAL MEDICINE

## 2024-12-06 PROCEDURE — 88342 IMHCHEM/IMCYTCHM 1ST ANTB: CPT | Performed by: PATHOLOGY

## 2024-12-06 PROCEDURE — 37000008 HC ANESTHESIA 1ST 15 MINUTES: Performed by: INTERNAL MEDICINE

## 2024-12-06 PROCEDURE — C1726 CATH, BAL DIL, NON-VASCULAR: HCPCS | Performed by: INTERNAL MEDICINE

## 2024-12-06 PROCEDURE — 43239 EGD BIOPSY SINGLE/MULTIPLE: CPT | Mod: 59,,, | Performed by: INTERNAL MEDICINE

## 2024-12-06 PROCEDURE — 88305 TISSUE EXAM BY PATHOLOGIST: CPT | Performed by: PATHOLOGY

## 2024-12-06 PROCEDURE — 43239 EGD BIOPSY SINGLE/MULTIPLE: CPT | Mod: 59 | Performed by: INTERNAL MEDICINE

## 2024-12-06 PROCEDURE — 43249 ESOPH EGD DILATION <30 MM: CPT | Performed by: INTERNAL MEDICINE

## 2024-12-06 PROCEDURE — 63600175 PHARM REV CODE 636 W HCPCS

## 2024-12-06 PROCEDURE — 37000009 HC ANESTHESIA EA ADD 15 MINS: Performed by: INTERNAL MEDICINE

## 2024-12-06 PROCEDURE — 27201012 HC FORCEPS, HOT/COLD, DISP: Performed by: INTERNAL MEDICINE

## 2024-12-06 RX ORDER — PROPOFOL 10 MG/ML
VIAL (ML) INTRAVENOUS
Status: DISCONTINUED | OUTPATIENT
Start: 2024-12-06 | End: 2024-12-06

## 2024-12-06 RX ORDER — PANTOPRAZOLE SODIUM 40 MG/1
40 TABLET, DELAYED RELEASE ORAL 2 TIMES DAILY
Qty: 60 TABLET | Refills: 1 | Status: SHIPPED | OUTPATIENT
Start: 2024-12-06 | End: 2025-02-04

## 2024-12-06 RX ADMIN — PROPOFOL 120 MG: 10 INJECTION, EMULSION INTRAVENOUS at 01:12

## 2024-12-06 RX ADMIN — PROPOFOL 40 MG: 10 INJECTION, EMULSION INTRAVENOUS at 01:12

## 2024-12-06 NOTE — ANESTHESIA PREPROCEDURE EVALUATION
12/06/2024  Sukh Meyers is a 59 y.o., male.    Patient Active Problem List   Diagnosis    Depression    GERD (gastroesophageal reflux disease)    Food impaction of esophagus    Dysphagia     Past Medical History:   Diagnosis Date    Depression     GERD (gastroesophageal reflux disease)     IBS (irritable bowel syndrome)      Past Surgical History:   Procedure Laterality Date    DENTAL SURGERY      EYE SURGERY         Pre-op Assessment    I have reviewed the Patient Summary Reports.     I have reviewed the Nursing Notes. I have reviewed the NPO Status.   I have reviewed the Medications.     Review of Systems  Anesthesia Hx:             Denies Family Hx of Anesthesia complications.    Denies Personal Hx of Anesthesia complications.                    Hepatic/GI:     GERD                    Physical Exam  General: Cooperative and Alert    Airway:  Mallampati: II   Mouth Opening: Normal  TM Distance: Normal  Tongue: Normal  Neck ROM: Normal ROM    Dental:  Intact          Anesthesia Plan  Type of Anesthesia, risks & benefits discussed:    Anesthesia Type: MAC, Gen Natural Airway  Intra-op Monitoring Plan: Standard ASA Monitors  Induction:  IV  Informed Consent: Informed consent signed with the Patient and all parties understand the risks and agree with anesthesia plan.  All questions answered.   ASA Score: 2  Day of Surgery Review of History & Physical: H&P Update referred to the surgeon/provider.    Ready For Surgery From Anesthesia Perspective.     .

## 2024-12-06 NOTE — H&P
Short Stay Endoscopy History and Physical    PCP - Amor Thompson MD    Procedure - EGD  ASA - per anesthesia  Mallampati - per anesthesia  History of Anesthesia problems - no  Family history Anesthesia problems -  no     HPI:  This is a 59 y.o. male here for evaluation of :   Active Hospital Problems    Diagnosis  POA    *GERD (gastroesophageal reflux disease) [K21.9]  Yes    Food impaction of esophagus [T18.128A, W44.F3XA]  No    Dysphagia [R13.10]  No      Resolved Hospital Problems   No resolved problems to display.         ROS:  CONSTITUTIONAL: Denies weight change,  fatigue, fevers, chills, night sweats.  CARDIOVASCULAR: Denies chest pain, shortness of breath, orthopnea and edema.  RESPIRATORY: Denies cough, hemoptysis, dyspnea, and wheezing.  GI: See HPI.    Medical History:   Past Medical History:   Diagnosis Date    Depression     GERD (gastroesophageal reflux disease)     IBS (irritable bowel syndrome)        Surgical History:   Past Surgical History:   Procedure Laterality Date    DENTAL SURGERY      EYE SURGERY         Family History:  Family History   Problem Relation Name Age of Onset    Cancer Mother          breast ca, throat, liver    COPD Mother      Osteoarthritis Mother         Social History:   Social History     Tobacco Use    Smoking status: Never    Smokeless tobacco: Never   Substance Use Topics    Alcohol use: No    Drug use: No       Allergy  Review of patient's allergies indicates:   Allergen Reactions    Ear drops rx Swelling     Not sure of name of medication took as a teen caused facial swelling    Milk containing products (dairy) Itching     Itching eyes       Medications:   No current facility-administered medications on file prior to encounter.     Current Outpatient Medications on File Prior to Encounter   Medication Sig Dispense Refill    mirtazapine (REMERON) 30 MG tablet Take 30 mg by mouth every evening.  0    pantoprazole (PROTONIX) 40 MG tablet Take 1 tablet (40 mg total)  by mouth once daily. 30 tablet 0    alprazolam (XANAX) 0.25 MG tablet 0.25 mg daily as needed.  0    propranolol (INDERAL) 10 MG tablet Take 10 mg by mouth daily as needed.      ranitidine (ZANTAC) 150 MG tablet Take 150 mg by mouth daily as needed.         Physical Exam:  Vital Signs:   Vitals:    12/06/24 1253   BP: 123/79   Pulse: 74   Resp: 19   Temp: 98.8 °F (37.1 °C)     General Appearance: Well appearing in no acute distress  ENT: OP clear  Chest: CTA B  CV: RRR, no m/r/g  Abd: s/nt/nd/nabs  Ext: no edema    Labs:  Reviewed    IMP:  Active Hospital Problems    Diagnosis  POA    *GERD (gastroesophageal reflux disease) [K21.9]  Yes    Food impaction of esophagus [T18.128A, W44.F3XA]  No    Dysphagia [R13.10]  No      Resolved Hospital Problems   No resolved problems to display.         Plan:  I have explained the risks and benefits of endoscopy procedures to the patient including but not limited to bleeding, perforation, infection, and death. The patient wishes to proceed.

## 2024-12-06 NOTE — ANESTHESIA POSTPROCEDURE EVALUATION
Anesthesia Post Evaluation    Patient: Sukh Meyers    Procedure(s) Performed: Procedure(s) (LRB):  EGD (ESOPHAGOGASTRODUODENOSCOPY) (N/A)    Final Anesthesia Type: MAC      Patient location during evaluation: GI PACU  Patient participation: Yes- Able to Participate  Level of consciousness: awake  Post-procedure vital signs: reviewed and stable  Pain management: adequate  Airway patency: patent    PONV status at discharge: No PONV  Anesthetic complications: no      Cardiovascular status: blood pressure returned to baseline and hemodynamically stable  Respiratory status: unassisted and spontaneous ventilation  Hydration status: euvolemic  Follow-up not needed.              Vitals Value Taken Time   /60 12/06/24 1344   Temp 36.6 °C (97.9 °F) 12/06/24 1344   Pulse 60 12/06/24 1344   Resp 17 12/06/24 1344   SpO2 97 % 12/06/24 1344         No case tracking events are documented in the log.      Pain/Rajni Score: Rajni Score: 9 (12/6/2024  1:44 PM)

## 2024-12-06 NOTE — DISCHARGE SUMMARY
[facility] - Endoscopy (Hospital)  Discharge Note  Short Stay    Procedure(s) (LRB):  EGD (ESOPHAGOGASTRODUODENOSCOPY) (N/A)      OUTCOME: Patient tolerated treatment/procedure well without complication and is now ready for discharge.    DISPOSITION: Home or Self Care    FINAL DIAGNOSIS:  GERD (gastroesophageal reflux disease)    FOLLOWUP: With primary care provider    DISCHARGE INSTRUCTIONS:  No discharge procedures on file.

## 2024-12-06 NOTE — [employer] PATIENT INSTRUCTIONS
Discharge Summary/Instructions after an Endoscopic Procedure  Patient Name: Sukh Meyers  Patient MRN: [id number]  Patient YOB: 1965 Friday, December 6, 2024 Krystal Camilo MD  Dear patient,  As a result of recent federal legislation (The Federal Cures Act), you may   receive lab or pathology results from your procedure in your [facility]   account before your physician is able to contact you. Your physician or   their representative will relay the results to you with their   recommendations at their soonest availability.  Thank you,  RESTRICTIONS:  During your procedure today, you received medications for sedation.  These   medications may affect your judgment, balance and coordination.  Therefore,   for 24 hours, you have the following restrictions:   - DO NOT drive a car, operate machinery, make legal/financial decisions,   sign important papers or drink alcohol.    ACTIVITY:  Today: no heavy lifting, straining or running due to procedural   sedation/anesthesia.  The following day: return to full activity including work.  DIET:  Eat and drink normally unless instructed otherwise.     TREATMENT FOR COMMON SIDE EFFECTS:  - Mild abdominal pain, nausea, belching, bloating or excessive gas:  rest,   eat lightly and use a heating pad.  - Sore Throat: treat with throat lozenges and/or gargle with warm salt   water.  - Because air was used during the procedure, expelling large amounts of air   from your rectum or belching is normal.  - If a bowel prep was taken, you may not have a bowel movement for 1-3 days.    This is normal.  SYMPTOMS TO WATCH FOR AND REPORT TO YOUR PHYSICIAN:  1. Abdominal pain or bloating, other than gas cramps.  2. Chest pain.  3. Back pain.  4. Signs of infection such as: chills or fever occurring within 24 hours   after the procedure.  5. Rectal bleeding, which would show as bright red, maroon, or black stools.   (A tablespoon of blood from the rectum is not serious, especially  if   hemorrhoids are present.)  6. Vomiting.  7. Weakness or dizziness.  GO DIRECTLY TO THE NEAREST EMERGENCY ROOM IF YOU HAVE ANY OF THE FOLLOWING:      Difficulty breathing              Chills and/or fever over 101 F   Persistent vomiting and/or vomiting blood   Severe abdominal pain   Severe chest pain   Black, tarry stools   Bleeding- more than one tablespoon   Any other symptom or condition that you feel may need urgent attention  Your doctor recommends these additional instructions:  If any biopsies were taken, your doctors clinic will contact you in 1 to 2   weeks with any results.  - Discharge patient to home (via wheelchair).   - Advance diet as tolerated.   - Use Protonix (pantoprazole) 40 mg PO BID for 1 month.   - Patient has a contact number available for emergencies.  The signs and   symptoms of potential delayed complications were discussed with the   patient.  Return to normal activities tomorrow.  Written discharge   instructions were provided to the patient.   - Repeat upper endoscopy PRN.  For questions, problems or results please call your physician Krystal Camilo MD at Work:  [phone]  If you have any questions about the above instructions, call the GI   department at [phone] or call the endoscopy unit at [phone]   from 7am until 3 pm.  [facility], EMERGENCY ROOM PHONE NUMBER:   [phone]  IF A COMPLICATION OR EMERGENCY SITUATION ARISES AND YOU ARE UNABLE TO REACH   YOUR PHYSICIAN - GO DIRECTLY TO THE EMERGENCY ROOM.  I have read or have had read to me these discharge instructions for my   procedure and have received a written copy.  I understand these   instructions and will follow-up with my physician if I have any questions.     __________________________________       _____________________________________  Nurse Signature                                          Patient/Designated   Responsible Party Signature  Krystal Camilo  MD Krystal Camilo MD  12/6/2024 1:45:08 PM  [employer]

## 2024-12-06 NOTE — PLAN OF CARE
Dr Camilo came to bedside and discussed findings. NO N/V,  no abdominal pain, no GI bleeding, and vitals stable.  Pt discharged from unit.

## 2024-12-06 NOTE — TRANSFER OF CARE
"Anesthesia Transfer of Care Note    Patient: Sukh Meyers    Procedure(s) Performed: Procedure(s) (LRB):  EGD (ESOPHAGOGASTRODUODENOSCOPY) (N/A)    Patient location: PACU    Anesthesia Type: MAC    Transport from OR: Transported from OR on room air with adequate spontaneous ventilation    Post pain: adequate analgesia    Post assessment: no apparent anesthetic complications    Post vital signs: stable    Level of consciousness: awake    Nausea/Vomiting: no nausea/vomiting    Complications: none    Transfer of care protocol was followed      Last vitals: Visit Vitals  /79 (BP Location: Left arm, Patient Position: Lying)   Pulse 74   Temp 37.1 °C (98.8 °F) (Temporal)   Resp 19   Ht 5' 9" (1.753 m)   Wt 76.2 kg (168 lb)   SpO2 98%   BMI 24.81 kg/m²     "

## 2024-12-09 VITALS
HEIGHT: 69 IN | DIASTOLIC BLOOD PRESSURE: 60 MMHG | HEART RATE: 60 BPM | BODY MASS INDEX: 24.88 KG/M2 | TEMPERATURE: 98 F | OXYGEN SATURATION: 97 % | WEIGHT: 168 LBS | SYSTOLIC BLOOD PRESSURE: 102 MMHG | RESPIRATION RATE: 17 BRPM

## 2024-12-11 LAB
FINAL PATHOLOGIC DIAGNOSIS: NORMAL
GROSS: NORMAL
Lab: NORMAL

## 2025-02-07 DIAGNOSIS — R13.10 ODYNOPHAGIA: ICD-10-CM

## 2025-02-07 RX ORDER — PANTOPRAZOLE SODIUM 40 MG/1
40 TABLET, DELAYED RELEASE ORAL 2 TIMES DAILY
Qty: 60 TABLET | Refills: 1 | Status: SHIPPED | OUTPATIENT
Start: 2025-02-07

## 2025-07-03 ENCOUNTER — OFFICE VISIT (OUTPATIENT)
Dept: GASTROENTEROLOGY | Facility: CLINIC | Age: 60
End: 2025-07-03
Payer: COMMERCIAL

## 2025-07-03 VITALS — WEIGHT: 168 LBS | HEIGHT: 69 IN | BODY MASS INDEX: 24.88 KG/M2
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
## 2025-07-03 DIAGNOSIS — T18.128D FOOD IMPACTION OF ESOPHAGUS, SUBSEQUENT ENCOUNTER: ICD-10-CM

## 2025-07-03 DIAGNOSIS — K22.2 SCHATZKI RING OF DISTAL ESOPHAGUS: ICD-10-CM

## 2025-07-03 DIAGNOSIS — K21.9 GASTROESOPHAGEAL REFLUX DISEASE, UNSPECIFIED WHETHER ESOPHAGITIS PRESENT: Primary | ICD-10-CM

## 2025-07-03 DIAGNOSIS — R13.10 ODYNOPHAGIA: ICD-10-CM

## 2025-07-03 DIAGNOSIS — R13.19 ESOPHAGEAL DYSPHAGIA: ICD-10-CM

## 2025-07-03 RX ORDER — PANTOPRAZOLE SODIUM 40 MG/1
40 TABLET, DELAYED RELEASE ORAL DAILY
Qty: 90 TABLET | Refills: 3 | Status: SHIPPED | OUTPATIENT
Start: 2025-07-03 | End: 2025-07-03

## 2025-07-03 RX ORDER — PANTOPRAZOLE SODIUM 40 MG/1
40 TABLET, DELAYED RELEASE ORAL DAILY
Qty: 30 TABLET | Refills: 5 | Status: SHIPPED | OUTPATIENT
Start: 2025-07-03 | End: 2025-12-30

## 2025-07-03 NOTE — PROGRESS NOTES
[facility]  Gastroenterology    PCP: Amor Thompson MD (Inactive)    7/3/25    HPI       esophageal dilation     Additional comments: Follow up from December          Last edited by Uzma Franklin MA on 7/3/2025  9:58 AM.          The patient location is: Home  The chief complaint leading to consultation is: Follow-up EGD    Visit type: audiovisual    Face to Face time with patient: 15 minutes of total time spent on the encounter, which includes face to face time and non-face to face time preparing to see the patient (eg, review of tests), Obtaining and/or reviewing separately obtained history, Documenting clinical information in the electronic or other health record, Independently interpreting results (not separately reported) and communicating results to the patient/family/caregiver, or Care coordination (not separately reported).         Each patient to whom he or she provides medical services by telemedicine is:  (1) informed of the relationship between the physician and patient and the respective role of any other health care provider with respect to management of the patient; and (2) notified that he or she may decline to receive medical services by telemedicine and may withdraw from such care at any time.    Notes:       Subjective:   Sukh Meyers is a 59 y.o. male here for follow-up EGD. Patient had an EGD in 12/2024 for dysphagia and found to have distal Schatzki ring with balloon dilation up to 12 mm. Was started onto Protonix and later weaned himself off of medication after a few months. Started to get symptoms again so placed himself back on it once a day and symptoms improved. No dysphagia at this time.       Past Medical History:   Diagnosis Date    Depression     GERD (gastroesophageal reflux disease)     IBS (irritable bowel syndrome)        Past Surgical History:   Procedure Laterality Date    DENTAL SURGERY      ESOPHAGOGASTRODUODENOSCOPY N/A 12/6/2024    Procedure: EGD  (ESOPHAGOGASTRODUODENOSCOPY);  Surgeon: Krystal Camilo MD;  Location: [facility];  Service: Endoscopy;  Laterality: N/A;    EYE SURGERY         Medications Ordered Prior to Encounter[1]    Review of patient's allergies indicates:   Allergen Reactions    Ear drops rx Swelling     Not sure of name of medication took as a teen caused facial swelling    Milk containing products (dairy) Itching     Itching eyes       Social History[2]    Family History   Problem Relation Name Age of Onset    Cancer Mother          breast ca, throat, liver    COPD Mother      Osteoarthritis Mother         Review of Systems   Constitutional:  Negative for appetite change, fever and unexpected weight change.   HENT:  Negative for sore throat and trouble swallowing.    Eyes:  Negative for visual disturbance.   Respiratory:  Negative for cough, shortness of breath and wheezing.    Cardiovascular:  Negative for chest pain, palpitations and leg swelling.   Gastrointestinal:  Negative for abdominal pain, blood in stool, constipation, diarrhea, nausea and vomiting.   Genitourinary:  Negative for dysuria.   Musculoskeletal:  Negative for arthralgias and myalgias.   Skin:  Negative for color change, pallor and rash.   Neurological:  Negative for dizziness and weakness.   Hematological:  Negative for adenopathy.   Psychiatric/Behavioral:  Negative for agitation.      Objective:   Vitals: There were no vitals filed for this visit.    Physical Exam Unable to perform due to video visit    IMPRESSION     Problem List Items Addressed This Visit          GI    GERD (gastroesophageal reflux disease) - Primary    Food impaction of esophagus    Dysphagia     Other Visit Diagnoses         Schatzki ring of distal esophagus                PLANS:    - No symptoms at this time  - S/p dilation in 12/2024 for Schatzki ring  - Agree that patient needs to remain on at least once daily dosing of PPI for now. Medication refilled. In the future, may be able to  taper to 20 mg  - No need for repeat EGD at this time unless the dysphagia starts to recur  - Reflux precautions  - RTC PRN    Gastroesophageal reflux disease, unspecified whether esophagitis present    Food impaction of esophagus, subsequent encounter    Esophageal dysphagia    Schatzki ring of distal esophagus      Krystal Camilo MD  Gastroenterology and Hepatology             [1]   Current Outpatient Medications on File Prior to Visit   Medication Sig Dispense Refill    alprazolam (XANAX) 0.25 MG tablet 0.25 mg daily as needed.  0    mirtazapine (REMERON) 30 MG tablet Take 30 mg by mouth every evening.  0    pantoprazole (PROTONIX) 40 MG tablet TAKE 1 TABLET(40 MG) BY MOUTH TWICE DAILY 60 tablet 1    propranolol (INDERAL) 10 MG tablet Take 10 mg by mouth daily as needed.       No current facility-administered medications on file prior to visit.   [2]   Social History  Socioeconomic History    Marital status: Single   Occupational History     Employer: [employer]   Tobacco Use    Smoking status: Never    Smokeless tobacco: Never   Substance and Sexual Activity    Alcohol use: No    Drug use: No     Social Drivers of Health     Financial Resource Strain: Low Risk  (9/11/2023)    Overall Financial Resource Strain (CARDIA)     Difficulty of Paying Living Expenses: Not hard at all   Food Insecurity: No Food Insecurity (9/11/2023)    Hunger Vital Sign     Worried About Running Out of Food in the Last Year: Never true     Ran Out of Food in the Last Year: Never true   Transportation Needs: No Transportation Needs (9/11/2023)    PRAPARE - Transportation     Lack of Transportation (Medical): No     Lack of Transportation (Non-Medical): No   Physical Activity: Sufficiently Active (9/11/2023)    Exercise Vital Sign     Days of Exercise per Week: 6 days     Minutes of Exercise per Session: 60 min   Stress: Stress Concern Present (9/11/2023)    [employer] - Occupational Stress  Questionnaire     Feeling of Stress : To some extent   Housing Stability: Low Risk  (9/11/2023)    Housing Stability Vital Sign     Unable to Pay for Housing in the Last Year: No     Number of Places Lived in the Last Year: 1     Unstable Housing in the Last Year: No